# Patient Record
Sex: FEMALE | Race: ASIAN | NOT HISPANIC OR LATINO
[De-identification: names, ages, dates, MRNs, and addresses within clinical notes are randomized per-mention and may not be internally consistent; named-entity substitution may affect disease eponyms.]

---

## 2019-07-03 PROBLEM — Z00.00 ENCOUNTER FOR PREVENTIVE HEALTH EXAMINATION: Status: ACTIVE | Noted: 2019-07-03

## 2019-07-11 ENCOUNTER — APPOINTMENT (OUTPATIENT)
Dept: CARDIOLOGY | Facility: CLINIC | Age: 79
End: 2019-07-11
Payer: MEDICARE

## 2019-07-11 VITALS
BODY MASS INDEX: 24.43 KG/M2 | SYSTOLIC BLOOD PRESSURE: 128 MMHG | TEMPERATURE: 99.3 F | HEART RATE: 78 BPM | RESPIRATION RATE: 17 BRPM | OXYGEN SATURATION: 97 % | DIASTOLIC BLOOD PRESSURE: 73 MMHG | HEIGHT: 58.27 IN | WEIGHT: 118 LBS

## 2019-07-11 PROCEDURE — 99204 OFFICE O/P NEW MOD 45 MIN: CPT

## 2019-07-11 NOTE — HISTORY OF PRESENT ILLNESS
[FreeTextEntry1] : 79 year-old female with HTN, HLD presents for evaluation of of CP. Patient reports L sided lower CP for the past 1-2 years. Pain is not related to exertion and is not tender and not related to meals, described as sharp pain lasting few minutes or few seconds. Patient denies SOB. Patient reports occasional palpitations. Patient had CXR recently. Patient reports that she was found to have bad lung function while she was in [facility] few years ago and she reports that she has bad coughs at night.  Patient had 3 episodes of dizziness and nausea and her BP was elevated. Question of previous minor stroke. Patient has trace edema from Amlodipine. I advised patient to undergo an echocardiogram and a treadmill stress test. Patient will return for stress test when she is less tired.

## 2019-07-11 NOTE — PHYSICAL EXAM
[General Appearance - Well Developed] : well developed [Normal Appearance] : normal appearance [Well Groomed] : well groomed [General Appearance - Well Nourished] : well nourished [No Deformities] : no deformities [General Appearance - In No Acute Distress] : no acute distress [Normal Conjunctiva] : the conjunctiva exhibited no abnormalities [Eyelids - No Xanthelasma] : the eyelids demonstrated no xanthelasmas [Normal Oral Mucosa] : normal oral mucosa [No Oral Pallor] : no oral pallor [No Oral Cyanosis] : no oral cyanosis [Normal Jugular Venous A Waves Present] : normal jugular venous A waves present [Normal Jugular Venous V Waves Present] : normal jugular venous V waves present [No Jugular Venous Sebastian A Waves] : no jugular venous sebastian A waves [Heart Rate And Rhythm] : heart rate and rhythm were normal [Heart Sounds] : normal S1 and S2 [Murmurs] : no murmurs present [Respiration, Rhythm And Depth] : normal respiratory rhythm and effort [Exaggerated Use Of Accessory Muscles For Inspiration] : no accessory muscle use [Auscultation Breath Sounds / Voice Sounds] : lungs were clear to auscultation bilaterally [Abdomen Soft] : soft [Abdomen Tenderness] : non-tender [Abdomen Mass (___ Cm)] : no abdominal mass palpated [Abnormal Walk] : normal gait [Gait - Sufficient For Exercise Testing] : the gait was sufficient for exercise testing [Nail Clubbing] : no clubbing of the fingernails [Cyanosis, Localized] : no localized cyanosis [Petechial Hemorrhages (___cm)] : no petechial hemorrhages [] : no ischemic changes [Oriented To Time, Place, And Person] : oriented to person, place, and time [Affect] : the affect was normal [Mood] : the mood was normal [No Anxiety] : not feeling anxious

## 2019-07-18 ENCOUNTER — APPOINTMENT (OUTPATIENT)
Dept: CARDIOLOGY | Facility: CLINIC | Age: 79
End: 2019-07-18
Payer: MEDICARE

## 2019-07-18 PROCEDURE — 93306 TTE W/DOPPLER COMPLETE: CPT

## 2019-07-18 PROCEDURE — 93015 CV STRESS TEST SUPVJ I&R: CPT

## 2021-04-20 ENCOUNTER — NON-APPOINTMENT (OUTPATIENT)
Age: 81
End: 2021-04-20

## 2021-04-20 ENCOUNTER — APPOINTMENT (OUTPATIENT)
Dept: CARDIOLOGY | Facility: CLINIC | Age: 81
End: 2021-04-20
Payer: MEDICARE

## 2021-04-20 VITALS
BODY MASS INDEX: 23.19 KG/M2 | HEART RATE: 82 BPM | WEIGHT: 112 LBS | SYSTOLIC BLOOD PRESSURE: 154 MMHG | OXYGEN SATURATION: 94 % | TEMPERATURE: 98 F | DIASTOLIC BLOOD PRESSURE: 82 MMHG | RESPIRATION RATE: 17 BRPM

## 2021-04-20 PROCEDURE — 93000 ELECTROCARDIOGRAM COMPLETE: CPT

## 2021-04-20 PROCEDURE — 99214 OFFICE O/P EST MOD 30 MIN: CPT

## 2021-04-20 PROCEDURE — 99072 ADDL SUPL MATRL&STAF TM PHE: CPT

## 2021-04-20 RX ORDER — METOPROLOL SUCCINATE 25 MG/1
25 TABLET, EXTENDED RELEASE ORAL
Refills: 0 | Status: ACTIVE | COMMUNITY

## 2021-04-20 NOTE — REASON FOR VISIT
[Follow-Up - Clinic] : a clinic follow-up of [FreeTextEntry1] : 4/20/21 - Patient reports dizziness for the past month, described as spinning sensation, worse when she changes position.  Patient reports elevated BP recently.  Her BP medications were adjusted and she is now on Metoprolol ER 25 mg and Losartan 50 mg.  She has seen neurology.  She was prescribed medication which she has not taken.  She has not had brain imaging study.  Her BP was elevated.  Patient was not orthostatic (->145->145).  I advised patient to undergo a brain MRI.  I advised patient to take Amlodipine 2.5 mg until her dizziness is better.  I advised patient to take dizziness medication as prescribed by her neurologist.  Patient is interested in wearing a Holter because of history of arrhythmia.  I have no objection.\par \par 7/11/19 - Patient reports L sided lower CP for the past 1-2 years. Pain is not related to exertion and is not tender and not related to meals, described as sharp pain lasting few minutes or few seconds. Patient denies SOB. Patient reports occasional palpitations. Patient had CXR recently. Patient reports that she was found to have bad lung function while she was in [facility] few years ago and she reports that she has bad coughs at night.  Patient had 3 episodes of dizziness and nausea and her BP was elevated. Question of previous minor stroke. Patient has trace edema from Amlodipine. I advised patient to undergo an echocardiogram and a treadmill stress test. Patient will return for stress test when she is less tired.

## 2021-04-20 NOTE — HISTORY OF PRESENT ILLNESS
[FreeTextEntry1] : 79 year-old female with HTN, HLD presents for followup.  \par \par Patient was last seen on 7/11/19 for evaluation of of CP.  Patient underwent an echocardiogram and it showed normal LV function without significant valvular pathology. Patient underwent a treadmill stress test and completed 4 minutes of Laz protocol.  There were upsloping ST depressions on ECG but no symptoms.  Following treadmill stress, there was no echocardiographic evidence of ischemia.

## 2021-04-20 NOTE — PHYSICAL EXAM
[Normal Appearance] : normal appearance [General Appearance - Well Developed] : well developed [Well Groomed] : well groomed [General Appearance - Well Nourished] : well nourished [No Deformities] : no deformities [General Appearance - In No Acute Distress] : no acute distress [Normal Conjunctiva] : the conjunctiva exhibited no abnormalities [Eyelids - No Xanthelasma] : the eyelids demonstrated no xanthelasmas [Normal Oral Mucosa] : normal oral mucosa [No Oral Pallor] : no oral pallor [No Oral Cyanosis] : no oral cyanosis [Normal Jugular Venous A Waves Present] : normal jugular venous A waves present [Normal Jugular Venous V Waves Present] : normal jugular venous V waves present [No Jugular Venous Sebastian A Waves] : no jugular venous sebastian A waves [Respiration, Rhythm And Depth] : normal respiratory rhythm and effort [Exaggerated Use Of Accessory Muscles For Inspiration] : no accessory muscle use [Auscultation Breath Sounds / Voice Sounds] : lungs were clear to auscultation bilaterally [Heart Rate And Rhythm] : heart rate and rhythm were normal [Heart Sounds] : normal S1 and S2 [Murmurs] : no murmurs present [Abdomen Soft] : soft [Abdomen Tenderness] : non-tender [Abdomen Mass (___ Cm)] : no abdominal mass palpated [Abnormal Walk] : normal gait [Gait - Sufficient For Exercise Testing] : the gait was sufficient for exercise testing [Nail Clubbing] : no clubbing of the fingernails [Cyanosis, Localized] : no localized cyanosis [Petechial Hemorrhages (___cm)] : no petechial hemorrhages [] : no ischemic changes [Oriented To Time, Place, And Person] : oriented to person, place, and time [Affect] : the affect was normal [Mood] : the mood was normal [No Anxiety] : not feeling anxious [Arterial Pulses Normal] : the arterial pulses were normal [Edema] : no peripheral edema present

## 2021-04-23 ENCOUNTER — NON-APPOINTMENT (OUTPATIENT)
Age: 81
End: 2021-04-23

## 2021-04-26 ENCOUNTER — NON-APPOINTMENT (OUTPATIENT)
Age: 81
End: 2021-04-26

## 2021-05-13 ENCOUNTER — NON-APPOINTMENT (OUTPATIENT)
Age: 81
End: 2021-05-13

## 2021-05-15 PROBLEM — R42 DIZZINESS: Status: ACTIVE | Noted: 2021-04-20

## 2021-05-15 NOTE — PHYSICAL EXAM
[General Appearance - Well Developed] : well developed [Normal Appearance] : normal appearance [Well Groomed] : well groomed [General Appearance - Well Nourished] : well nourished [No Deformities] : no deformities [General Appearance - In No Acute Distress] : no acute distress [Normal Conjunctiva] : the conjunctiva exhibited no abnormalities [Eyelids - No Xanthelasma] : the eyelids demonstrated no xanthelasmas [Normal Oral Mucosa] : normal oral mucosa [No Oral Pallor] : no oral pallor [No Oral Cyanosis] : no oral cyanosis [Normal Jugular Venous A Waves Present] : normal jugular venous A waves present [Normal Jugular Venous V Waves Present] : normal jugular venous V waves present [No Jugular Venous Sebastian A Waves] : no jugular venous sebastian A waves [Respiration, Rhythm And Depth] : normal respiratory rhythm and effort [Exaggerated Use Of Accessory Muscles For Inspiration] : no accessory muscle use [Auscultation Breath Sounds / Voice Sounds] : lungs were clear to auscultation bilaterally [Heart Rate And Rhythm] : heart rate and rhythm were normal [Heart Sounds] : normal S1 and S2 [Murmurs] : no murmurs present [Arterial Pulses Normal] : the arterial pulses were normal [Edema] : no peripheral edema present [Abdomen Soft] : soft [Abdomen Tenderness] : non-tender [Abdomen Mass (___ Cm)] : no abdominal mass palpated [Abnormal Walk] : normal gait [Gait - Sufficient For Exercise Testing] : the gait was sufficient for exercise testing [Nail Clubbing] : no clubbing of the fingernails [Cyanosis, Localized] : no localized cyanosis [Petechial Hemorrhages (___cm)] : no petechial hemorrhages [] : no ischemic changes [Oriented To Time, Place, And Person] : oriented to person, place, and time [Affect] : the affect was normal [Mood] : the mood was normal [No Anxiety] : not feeling anxious

## 2021-05-19 ENCOUNTER — APPOINTMENT (OUTPATIENT)
Dept: CARDIOLOGY | Facility: CLINIC | Age: 81
End: 2021-05-19
Payer: MEDICARE

## 2021-05-19 VITALS
RESPIRATION RATE: 17 BRPM | HEART RATE: 72 BPM | OXYGEN SATURATION: 96 % | SYSTOLIC BLOOD PRESSURE: 150 MMHG | WEIGHT: 112 LBS | BODY MASS INDEX: 23.19 KG/M2 | TEMPERATURE: 98 F | DIASTOLIC BLOOD PRESSURE: 77 MMHG

## 2021-05-19 DIAGNOSIS — R42 DIZZINESS AND GIDDINESS: ICD-10-CM

## 2021-05-19 PROCEDURE — 99213 OFFICE O/P EST LOW 20 MIN: CPT

## 2021-05-19 RX ORDER — AMLODIPINE BESYLATE 2.5 MG/1
2.5 TABLET ORAL DAILY
Qty: 30 | Refills: 5 | Status: ACTIVE | COMMUNITY
Start: 2021-05-19 | End: 1900-01-01

## 2021-05-19 RX ORDER — LOSARTAN POTASSIUM 100 MG/1
100 TABLET, FILM COATED ORAL
Refills: 0 | Status: ACTIVE | COMMUNITY

## 2021-06-25 ENCOUNTER — APPOINTMENT (OUTPATIENT)
Dept: CARDIOLOGY | Facility: CLINIC | Age: 81
End: 2021-06-25

## 2021-09-03 NOTE — REASON FOR VISIT
[Symptom and Test Evaluation] : symptom and test evaluation [FreeTextEntry1] : 5/19/21 - Patient has elevated BP at 150/77. I advised patient to add Amlodipine 2.5 mg and continue on Metoprolol ER 25 mg and Losartan 50 mg. Patient reports back pain and epigastric tenderness. I advised patient to get nuclear stress test. Patient will consider. \par \par \par \par 4/20/21 - Patient reports dizziness for the past month, described as spinning sensation, worse when she changes position.  Patient reports elevated BP recently.  Her BP medications were adjusted and she is now on Metoprolol ER 25 mg and Losartan 50 mg.  She has seen neurology.  She was prescribed medication which she has not taken.  She has not had brain imaging study.  Her BP was elevated.  Patient was not orthostatic (->145->145).  I advised patient to undergo a brain MRI.  I advised patient to take Amlodipine 2.5 mg until her dizziness is better.  I advised patient to take dizziness medication as prescribed by her neurologist.  Patient is interested in wearing a Holter because of history of arrhythmia.  I have no objection.\par \par 7/11/19 - Patient reports L sided lower CP for the past 1-2 years. Pain is not related to exertion and is not tender and not related to meals, described as sharp pain lasting few minutes or few seconds. Patient denies SOB. Patient reports occasional palpitations. Patient had CXR recently. Patient reports that she was found to have bad lung function while she was in [facility] few years ago and she reports that she has bad coughs at night.  Patient had 3 episodes of dizziness and nausea and her BP was elevated. Question of previous minor stroke. Patient has trace edema from Amlodipine. I advised patient to undergo an echocardiogram and a treadmill stress test. Patient will return for stress test when she is less tired.

## 2021-09-03 NOTE — HISTORY OF PRESENT ILLNESS
[FreeTextEntry1] : 81 year-old female with HTN, HLD presents for followup.  \par \par Patient was last seen on 4/20/21 for dizziness.  Patient wore a Holter and it showed rare APC's, rare PVC's, without significant arrhythmia.  Brain MRI showed small vessel ischemic changes but otherwise normal.  I advised patient to start Amlodipine 2.5 mg for elevated BP.  Her PCP increased her Losartan to 100 mg.\par \par Last echo was on 7/18/19 and it showed normal LV function without significant valvular pathology. \par \par Last stress was on 7/18/19 and completed 4 minutes of Laz protocol.  There were upsloping ST depressions on ECG but no symptoms.  Following treadmill stress, there was no echocardiographic evidence of ischemia.

## 2022-05-12 ENCOUNTER — NON-APPOINTMENT (OUTPATIENT)
Age: 82
End: 2022-05-12

## 2022-05-12 ENCOUNTER — APPOINTMENT (OUTPATIENT)
Dept: CARDIOLOGY | Facility: CLINIC | Age: 82
End: 2022-05-12
Payer: MEDICARE

## 2022-05-12 VITALS
OXYGEN SATURATION: 95 % | HEART RATE: 76 BPM | WEIGHT: 113 LBS | DIASTOLIC BLOOD PRESSURE: 68 MMHG | TEMPERATURE: 98.1 F | BODY MASS INDEX: 23.4 KG/M2 | SYSTOLIC BLOOD PRESSURE: 124 MMHG | RESPIRATION RATE: 18 BRPM

## 2022-05-12 PROCEDURE — 93000 ELECTROCARDIOGRAM COMPLETE: CPT

## 2022-05-12 PROCEDURE — 99214 OFFICE O/P EST MOD 30 MIN: CPT

## 2022-05-24 NOTE — HISTORY OF PRESENT ILLNESS
[FreeTextEntry1] : 5/12/22 - Patient reports that she has L sided mild sharp focal  CP not related to exertion lasting minutes. Patient denies SOB. I advised patient to undergo nuclear stress test. I advised patient to undergo CXR. \par \par \par 5/19/21 - Patient has elevated BP at 150/77. I advised patient to add Amlodipine 2.5 mg and continue on Metoprolol ER 25 mg and Losartan 50 mg. Patient reports back pain and epigastric tenderness. I advised patient to get nuclear stress test. Patient will consider. \par \par 4/20/21 - Patient reports dizziness for the past month, described as spinning sensation, worse when she changes position.  Patient reports elevated BP recently.  Her BP medications were adjusted and she is now on Metoprolol ER 25 mg and Losartan 50 mg.  She has seen neurology.  She was prescribed medication which she has not taken.  She has not had brain imaging study.  Her BP was elevated.  Patient was not orthostatic (->145->145).  I advised patient to undergo a brain MRI.  I advised patient to take Amlodipine 2.5 mg until her dizziness is better.  I advised patient to take dizziness medication as prescribed by her neurologist.  Patient is interested in wearing a Holter because of history of arrhythmia.  I have no objection.\par \par 7/11/19 - Patient reports L sided lower CP for the past 1-2 years. Pain is not related to exertion and is not tender and not related to meals, described as sharp pain lasting few minutes or few seconds. Patient denies SOB. Patient reports occasional palpitations. Patient had CXR recently. Patient reports that she was found to have bad lung function while she was in [facility] few years ago and she reports that she has bad coughs at night.  Patient had 3 episodes of dizziness and nausea and her BP was elevated. Question of previous minor stroke. Patient has trace edema from Amlodipine. I advised patient to undergo an echocardiogram and a treadmill stress test. Patient will return for stress test when she is less tired.

## 2022-05-24 NOTE — REASON FOR VISIT
[FreeTextEntry1] : 82 year-old female with HTN, HLD presents for followup.  \par \par Patient was last seen on 5/19/21 for elevated BP.  I advised patient to add Amlodipine 2.5 mg and continue  Metoprolol ER 25 mg and Losartan 100 mg.  Patient reported back pain and epigastric tenderness. I advised patient to get nuclear stress test.   Patient would consider. \par \par She is on Losartan 100 mg, Metoprolol ER 25 mg, and Amlodipine 2.5 mg for HTN.\par \par Last echo was on 7/18/19 and it showed normal LV function without significant valvular pathology. \par \par Last stress was on 7/18/19 and completed 4 minutes of Laz protocol.  There were upsloping ST depressions on ECG but no symptoms.  Following treadmill stress, there was no echocardiographic evidence of ischemia. \par \par Patient wore a Holter 4/20/21 and it showed rare APC's, rare PVC's, without significant arrhythmia.

## 2022-07-05 PROBLEM — I10 HTN (HYPERTENSION): Status: ACTIVE | Noted: 2021-04-20

## 2022-07-05 NOTE — PHYSICAL EXAM
[General Appearance - Well Developed] : well developed [Normal Appearance] : normal appearance [Well Groomed] : well groomed [General Appearance - Well Nourished] : well nourished [No Deformities] : no deformities [General Appearance - In No Acute Distress] : no acute distress [Normal Conjunctiva] : the conjunctiva exhibited no abnormalities [Eyelids - No Xanthelasma] : the eyelids demonstrated no xanthelasmas [Normal Oral Mucosa] : normal oral mucosa [No Oral Pallor] : no oral pallor [No Oral Cyanosis] : no oral cyanosis [Normal Jugular Venous A Waves Present] : normal jugular venous A waves present [Normal Jugular Venous V Waves Present] : normal jugular venous V waves present [No Jugular Venous Sebastian A Waves] : no jugular venous sebastian A waves [Respiration, Rhythm And Depth] : normal respiratory rhythm and effort [Exaggerated Use Of Accessory Muscles For Inspiration] : no accessory muscle use [Auscultation Breath Sounds / Voice Sounds] : lungs were clear to auscultation bilaterally [Heart Rate And Rhythm] : heart rate and rhythm were normal [Murmurs] : no murmurs present [Heart Sounds] : normal S1 and S2 [Arterial Pulses Normal] : the arterial pulses were normal [Edema] : no peripheral edema present [Abdomen Soft] : soft [Abdomen Tenderness] : non-tender [Abdomen Mass (___ Cm)] : no abdominal mass palpated [Abnormal Walk] : normal gait [Gait - Sufficient For Exercise Testing] : the gait was sufficient for exercise testing [Nail Clubbing] : no clubbing of the fingernails [Cyanosis, Localized] : no localized cyanosis [Petechial Hemorrhages (___cm)] : no petechial hemorrhages [] : no ischemic changes [Oriented To Time, Place, And Person] : oriented to person, place, and time [Mood] : the mood was normal [Affect] : the affect was normal [No Anxiety] : not feeling anxious

## 2022-07-07 ENCOUNTER — APPOINTMENT (OUTPATIENT)
Dept: CARDIOLOGY | Facility: CLINIC | Age: 82
End: 2022-07-07

## 2022-07-07 VITALS
SYSTOLIC BLOOD PRESSURE: 137 MMHG | DIASTOLIC BLOOD PRESSURE: 75 MMHG | OXYGEN SATURATION: 95 % | TEMPERATURE: 97.9 F | WEIGHT: 109 LBS | RESPIRATION RATE: 18 BRPM | HEART RATE: 79 BPM | BODY MASS INDEX: 22.57 KG/M2

## 2022-07-07 DIAGNOSIS — I10 ESSENTIAL (PRIMARY) HYPERTENSION: ICD-10-CM

## 2022-07-07 PROCEDURE — 99213 OFFICE O/P EST LOW 20 MIN: CPT

## 2022-07-23 NOTE — HISTORY OF PRESENT ILLNESS
[FreeTextEntry1] : 7/7/22- Patient reports L sided CP for 2 days. Patient still has not undergone nuclear stress test, patient lives in [place] and wants to arrange her endoscopy and colonoscopy and nuclear stress test all around the same days. Patient requested for nitroglycerine refills.   \par \par 5/12/22 - Patient reports that she has L sided mild sharp focal  CP not related to exertion lasting minutes. Patient denies SOB. I advised patient to undergo nuclear stress test. I advised patient to undergo CXR. \par \par 5/19/21 - Patient has elevated BP at 150/77. I advised patient to add Amlodipine 2.5 mg and continue on Metoprolol ER 25 mg and Losartan 50 mg. Patient reports back pain and epigastric tenderness. I advised patient to get nuclear stress test. Patient will consider. \par \par 4/20/21 - Patient reports dizziness for the past month, described as spinning sensation, worse when she changes position.  Patient reports elevated BP recently.  Her BP medications were adjusted and she is now on Metoprolol ER 25 mg and Losartan 50 mg.  She has seen neurology.  She was prescribed medication which she has not taken.  She has not had brain imaging study.  Her BP was elevated.  Patient was not orthostatic (->145->145).  I advised patient to undergo a brain MRI.  I advised patient to take Amlodipine 2.5 mg until her dizziness is better.  I advised patient to take dizziness medication as prescribed by her neurologist.  Patient is interested in wearing a Holter because of history of arrhythmia.  I have no objection.\par \par 7/11/19 - Patient reports L sided lower CP for the past 1-2 years. Pain is not related to exertion and is not tender and not related to meals, described as sharp pain lasting few minutes or few seconds. Patient denies SOB. Patient reports occasional palpitations. Patient had CXR recently. Patient reports that she was found to have bad lung function while she was in [facility] few years ago and she reports that she has bad coughs at night.  Patient had 3 episodes of dizziness and nausea and her BP was elevated. Question of previous minor stroke. Patient has trace edema from Amlodipine. I advised patient to undergo an echocardiogram and a treadmill stress test. Patient will return for stress test when she is less tired.

## 2022-07-23 NOTE — REASON FOR VISIT
[FreeTextEntry1] : 82 year-old female with HTN, HLD presents for followup.  \par \par Patient was last seen on 5/12/22 for L sided mild sharp focal  CP not related to exertion lasting minutes.   I advised patient to undergo nuclear stress test. I advised patient to undergo CXR.  Patient underwent a CXR and it showed no lung pathology.  \par \par She is on Losartan 100 mg, Metoprolol ER 25 mg, and Amlodipine 2.5 mg for HTN.\par \par Last echo was on 7/18/19 and it showed normal LV function without significant valvular pathology. \par \par Last stress was on 7/18/19 and completed 4 minutes of Laz protocol.  There were upsloping ST depressions on ECG but no symptoms.  Following treadmill stress, there was no echocardiographic evidence of ischemia. \par \par Patient wore a Holter 4/20/21 and it showed rare APC's, rare PVC's, without significant arrhythmia.

## 2022-08-01 ENCOUNTER — APPOINTMENT (OUTPATIENT)
Dept: UROLOGY | Facility: CLINIC | Age: 82
End: 2022-08-01

## 2024-02-02 ENCOUNTER — APPOINTMENT (OUTPATIENT)
Dept: CARDIOLOGY | Facility: CLINIC | Age: 84
End: 2024-02-02
Payer: MEDICARE

## 2024-02-02 ENCOUNTER — NON-APPOINTMENT (OUTPATIENT)
Age: 84
End: 2024-02-02

## 2024-02-02 VITALS
HEART RATE: 86 BPM | DIASTOLIC BLOOD PRESSURE: 70 MMHG | RESPIRATION RATE: 18 BRPM | SYSTOLIC BLOOD PRESSURE: 111 MMHG | TEMPERATURE: 97.5 F | WEIGHT: 110 LBS | BODY MASS INDEX: 22.78 KG/M2 | OXYGEN SATURATION: 95 %

## 2024-02-02 DIAGNOSIS — R07.89 OTHER CHEST PAIN: ICD-10-CM

## 2024-02-02 PROCEDURE — 93000 ELECTROCARDIOGRAM COMPLETE: CPT

## 2024-02-02 PROCEDURE — 99214 OFFICE O/P EST MOD 30 MIN: CPT

## 2024-02-02 RX ORDER — NITROGLYCERIN 0.4 MG/1
0.4 TABLET SUBLINGUAL
Qty: 1 | Refills: 0 | Status: ACTIVE | COMMUNITY
Start: 2019-07-18 | End: 1900-01-01

## 2024-02-02 RX ORDER — NITROGLYCERIN 20 MG/1
0.1 PATCH TRANSDERMAL DAILY
Qty: 30 | Refills: 5 | Status: ACTIVE | COMMUNITY
Start: 2024-02-02 | End: 1900-01-01

## 2024-02-03 NOTE — REASON FOR VISIT
[FreeTextEntry1] : 82 year-old female with HTN, HLD presents for followup.    Patient was last seen on 5/12/22 for L sided mild sharp focal  CP not related to exertion lasting minutes.   I advised patient to undergo nuclear stress test. I advised patient to undergo CXR.  Patient underwent a CXR and it showed no lung pathology.    She is on Losartan 100 mg, Metoprolol ER 25 mg, and Amlodipine 2.5 mg for HTN.  Last echo was on 7/18/19 and it showed normal LV function without significant valvular pathology.   Last stress was on 7/18/19 and completed 4 minutes of Laz protocol.  There were upsloping ST depressions on ECG but no symptoms.  Following treadmill stress, there was no echocardiographic evidence of ischemia.   Patient wore a Holter 4/20/21 and it showed rare APC's, rare PVC's, without significant arrhythmia.

## 2024-02-03 NOTE — HISTORY OF PRESENT ILLNESS
[FreeTextEntry1] : 2/2/24 - Patient reports sharp L lower CP lasting seconds, not related to exertion. Patient reports vertigo. She is on Losartan 100 mg, Metoprolol ER 25 mg, Amlodipine 2.5 mg. Patient requests for SL NTG.  7/7/22- Patient reports L sided CP for 2 days. Patient still has not undergone nuclear stress test, patient lives in [place] and wants to arrange her endoscopy and colonoscopy and nuclear stress test all around the same days. Patient requested for nitroglycerine refills.     5/12/22 - Patient reports that she has L sided mild sharp focal  CP not related to exertion lasting minutes. Patient denies SOB. I advised patient to undergo nuclear stress test. I advised patient to undergo CXR.   5/19/21 - Patient has elevated BP at 150/77. I advised patient to add Amlodipine 2.5 mg and continue on Metoprolol ER 25 mg and Losartan 50 mg. Patient reports back pain and epigastric tenderness. I advised patient to get nuclear stress test. Patient will consider.   4/20/21 - Patient reports dizziness for the past month, described as spinning sensation, worse when she changes position.  Patient reports elevated BP recently.  Her BP medications were adjusted and she is now on Metoprolol ER 25 mg and Losartan 50 mg.  She has seen neurology.  She was prescribed medication which she has not taken.  She has not had brain imaging study.  Her BP was elevated.  Patient was not orthostatic (->145->145).  I advised patient to undergo a brain MRI.  I advised patient to take Amlodipine 2.5 mg until her dizziness is better.  I advised patient to take dizziness medication as prescribed by her neurologist.  Patient is interested in wearing a Holter because of history of arrhythmia.  I have no objection.  7/11/19 - Patient reports L sided lower CP for the past 1-2 years. Pain is not related to exertion and is not tender and not related to meals, described as sharp pain lasting few minutes or few seconds. Patient denies SOB. Patient reports occasional palpitations. Patient had CXR recently. Patient reports that she was found to have bad lung function while she was in [facility] few years ago and she reports that she has bad coughs at night.  Patient had 3 episodes of dizziness and nausea and her BP was elevated. Question of previous minor stroke. Patient has trace edema from Amlodipine. I advised patient to undergo an echocardiogram and a treadmill stress test. Patient will return for stress test when she is less tired.

## 2024-02-04 PROBLEM — R07.89 OTHER CHEST PAIN: Status: ACTIVE | Noted: 2024-02-02

## 2024-08-06 ENCOUNTER — RX RENEWAL (OUTPATIENT)
Age: 84
End: 2024-08-06

## 2024-11-19 ENCOUNTER — APPOINTMENT (OUTPATIENT)
Dept: CARDIOLOGY | Facility: CLINIC | Age: 84
End: 2024-11-19
Payer: MEDICARE

## 2024-11-19 ENCOUNTER — NON-APPOINTMENT (OUTPATIENT)
Age: 84
End: 2024-11-19

## 2024-11-19 VITALS
HEART RATE: 86 BPM | RESPIRATION RATE: 18 BRPM | WEIGHT: 112 LBS | DIASTOLIC BLOOD PRESSURE: 74 MMHG | OXYGEN SATURATION: 95 % | SYSTOLIC BLOOD PRESSURE: 149 MMHG | BODY MASS INDEX: 23.19 KG/M2

## 2024-11-19 DIAGNOSIS — I10 ESSENTIAL (PRIMARY) HYPERTENSION: ICD-10-CM

## 2024-11-19 DIAGNOSIS — R07.89 OTHER CHEST PAIN: ICD-10-CM

## 2024-11-19 PROCEDURE — 93000 ELECTROCARDIOGRAM COMPLETE: CPT

## 2024-11-19 PROCEDURE — 99214 OFFICE O/P EST MOD 30 MIN: CPT | Mod: 25

## 2025-04-28 ENCOUNTER — APPOINTMENT (OUTPATIENT)
Dept: CARDIOLOGY | Facility: CLINIC | Age: 85
End: 2025-04-28

## 2025-09-17 ENCOUNTER — APPOINTMENT (OUTPATIENT)
Dept: CARDIOLOGY | Facility: CLINIC | Age: 85
End: 2025-09-17

## 2025-09-17 ENCOUNTER — APPOINTMENT (OUTPATIENT)
Dept: CARDIOLOGY | Facility: CLINIC | Age: 85
End: 2025-09-17
Payer: MEDICARE

## 2025-09-17 VITALS
HEIGHT: 58 IN | WEIGHT: 112 LBS | HEART RATE: 83 BPM | BODY MASS INDEX: 23.51 KG/M2 | SYSTOLIC BLOOD PRESSURE: 122 MMHG | OXYGEN SATURATION: 97 % | DIASTOLIC BLOOD PRESSURE: 65 MMHG | RESPIRATION RATE: 18 BRPM

## 2025-09-17 DIAGNOSIS — R07.89 OTHER CHEST PAIN: ICD-10-CM

## 2025-09-17 DIAGNOSIS — I10 ESSENTIAL (PRIMARY) HYPERTENSION: ICD-10-CM

## 2025-09-17 PROCEDURE — 93000 ELECTROCARDIOGRAM COMPLETE: CPT

## 2025-09-17 PROCEDURE — 99214 OFFICE O/P EST MOD 30 MIN: CPT
